# Patient Record
Sex: FEMALE | Race: WHITE | Employment: FULL TIME | URBAN - METROPOLITAN AREA
[De-identification: names, ages, dates, MRNs, and addresses within clinical notes are randomized per-mention and may not be internally consistent; named-entity substitution may affect disease eponyms.]

---

## 2019-08-28 ENCOUNTER — HOSPITAL ENCOUNTER (OUTPATIENT)
Facility: CLINIC | Age: 54
Discharge: HOME | End: 2019-08-28
Attending: COLON & RECTAL SURGERY | Admitting: COLON & RECTAL SURGERY
Payer: COMMERCIAL

## 2019-08-28 VITALS
HEART RATE: 67 BPM | OXYGEN SATURATION: 96 % | WEIGHT: 165 LBS | DIASTOLIC BLOOD PRESSURE: 67 MMHG | SYSTOLIC BLOOD PRESSURE: 110 MMHG | HEIGHT: 63 IN | BODY MASS INDEX: 29.23 KG/M2 | RESPIRATION RATE: 20 BRPM

## 2019-08-28 LAB — COLONOSCOPY: NORMAL

## 2019-08-28 PROCEDURE — 88305 TISSUE EXAM BY PATHOLOGIST: CPT | Performed by: COLON & RECTAL SURGERY

## 2019-08-28 PROCEDURE — 88305 TISSUE EXAM BY PATHOLOGIST: CPT | Mod: 26 | Performed by: COLON & RECTAL SURGERY

## 2019-08-28 PROCEDURE — 25000128 H RX IP 250 OP 636: Performed by: COLON & RECTAL SURGERY

## 2019-08-28 PROCEDURE — G0500 MOD SEDAT ENDO SERVICE >5YRS: HCPCS | Performed by: COLON & RECTAL SURGERY

## 2019-08-28 PROCEDURE — 45385 COLONOSCOPY W/LESION REMOVAL: CPT | Mod: PT | Performed by: COLON & RECTAL SURGERY

## 2019-08-28 PROCEDURE — 99153 MOD SED SAME PHYS/QHP EA: CPT | Performed by: COLON & RECTAL SURGERY

## 2019-08-28 PROCEDURE — 45380 COLONOSCOPY AND BIOPSY: CPT | Performed by: COLON & RECTAL SURGERY

## 2019-08-28 RX ORDER — NALOXONE HYDROCHLORIDE 0.4 MG/ML
.1-.4 INJECTION, SOLUTION INTRAMUSCULAR; INTRAVENOUS; SUBCUTANEOUS
Status: DISCONTINUED | OUTPATIENT
Start: 2019-08-28 | End: 2019-08-28 | Stop reason: HOSPADM

## 2019-08-28 RX ORDER — LIDOCAINE 40 MG/G
CREAM TOPICAL
Status: DISCONTINUED | OUTPATIENT
Start: 2019-08-28 | End: 2019-08-28 | Stop reason: HOSPADM

## 2019-08-28 RX ORDER — ONDANSETRON 4 MG/1
4 TABLET, ORALLY DISINTEGRATING ORAL EVERY 6 HOURS PRN
Status: DISCONTINUED | OUTPATIENT
Start: 2019-08-28 | End: 2019-08-28 | Stop reason: HOSPADM

## 2019-08-28 RX ORDER — PROCHLORPERAZINE MALEATE 10 MG
10 TABLET ORAL EVERY 6 HOURS PRN
Status: DISCONTINUED | OUTPATIENT
Start: 2019-08-28 | End: 2019-08-28 | Stop reason: HOSPADM

## 2019-08-28 RX ORDER — FLUMAZENIL 0.1 MG/ML
0.2 INJECTION, SOLUTION INTRAVENOUS
Status: DISCONTINUED | OUTPATIENT
Start: 2019-08-28 | End: 2019-08-28 | Stop reason: HOSPADM

## 2019-08-28 RX ORDER — ONDANSETRON 2 MG/ML
4 INJECTION INTRAMUSCULAR; INTRAVENOUS
Status: DISCONTINUED | OUTPATIENT
Start: 2019-08-28 | End: 2019-08-28 | Stop reason: HOSPADM

## 2019-08-28 RX ORDER — FENTANYL CITRATE 50 UG/ML
INJECTION, SOLUTION INTRAMUSCULAR; INTRAVENOUS PRN
Status: DISCONTINUED | OUTPATIENT
Start: 2019-08-28 | End: 2019-08-28 | Stop reason: HOSPADM

## 2019-08-28 RX ORDER — ONDANSETRON 2 MG/ML
4 INJECTION INTRAMUSCULAR; INTRAVENOUS EVERY 6 HOURS PRN
Status: DISCONTINUED | OUTPATIENT
Start: 2019-08-28 | End: 2019-08-28 | Stop reason: HOSPADM

## 2019-08-28 ASSESSMENT — MIFFLIN-ST. JEOR: SCORE: 1317.57

## 2019-08-28 NOTE — H&P
"Pre-Endoscopy History and Physical   Barbra Salinas MRN# [id number]   YOB: 1965 Age: 54 year old   Date of Procedure: 8/28/2019   Primary care provider: Christin Cunningham   Type of Endoscopy: colonoscopy   Reason for Procedure: screening   Type of Anesthesia Anticipated: Moderate Sedation   HPI:   Barbra is a 54 year old female for screening colonoscopy.  She has never had a colonoscopy before.  She denies BRBPR, abdominal pain, nausea/vomiting, changes in bowel habits or unintentional weight loss.  She denies a FH of CRC.    Allergies   Allergen Reactions     No Known Allergies       Prior to Admission Medications   Prescriptions Last Dose Informant Patient Reported? Taking?   KEFLEX 500 MG PO CAPS Unknown at Unknown time  No No   Sig: TAKE ONE CAPSULE 4 TIMES DAILY PO FOR 10 DAYS   METROCREAM 0.75 % EX CREA Unknown at Unknown time  No No   Sig: apply to affected BID   NO ACTIVE MEDICATIONS Unknown at Unknown time  No No   Sig: .   RETIN-A 0.025 % EX CREA Unknown at Unknown time  No No   Sig: apply to affected area qhs      Facility-Administered Medications: None      Patient Active Problem List   Diagnosis     Rosacea     CARDIOVASCULAR SCREENING; LDL GOAL LESS THAN 160      History reviewed. No pertinent past medical history.   History reviewed. No pertinent surgical history.   Social History     Tobacco Use     Smoking status: Never Smoker     Smokeless tobacco: Never Used   Substance Use Topics     Alcohol use: Not on file      Family History   Problem Relation Age of Onset     Diabetes Mother      Hypertension Mother      Hypertension Father       PHYSICAL EXAM:   BP (!) 142/84   Pulse 69   Resp 11   Ht 1.6 m (5' 3\")   Wt 74.8 kg (165 lb)   SpO2 99%   BMI 29.23 kg/m   Estimated body mass index is 29.23 kg/m  as calculated from the following:    Height as of this encounter: 1.6 m (5' 3\").    Weight as of this encounter: 74.8 kg (165 lb).   RESP: lungs clear to auscultation - no rales, " rhonchi or wheezes   CV: regular rates and rhythm   ASA Class 2 - Mild systemic disease    Assessment: 53 y/o woman for screening colonoscopy    Plan: Colonoscopy with moderate sedation.  Risks of the procedure were discussed including, but not limited to, bleeding, perforation and missed lesions.  Patient understands and is willing to proceed.    Tray Vincent MD ....................  8/28/2019   2:37 PM  Colon and Rectal Surgery Staff  [phone]

## 2019-08-28 NOTE — DISCHARGE INSTRUCTIONS
The patient has received a copy of the Provation  report the doctor has written and discharge instructions have been discussed with the patient and responsible adult.  All questions were addressed and answered prior to patient discharge.    Understanding Colon and Rectal Polyps     The colon has a smooth lining composed of millions of cells.     The colon (also called the large intestine) is a muscular tube that forms the last part of the digestive tract. It absorbs water and stores food waste. The colon is about 4 to 6 feet long. The rectum is the last 6 inches of the colon. The colon and rectum have a smooth lining composed of millions of cells. Changes in these cells can lead to growths in the colon that can become cancerous and should be removed.     When the Colon Lining Changes  Changes that occur in the cells that line the colon or rectum can lead to growths called polyps. Over a period of years, polyps can turn cancerous. Removing polyps early may prevent cancer from ever forming.      Polyps  Polyps are fleshy clumps of tissue that form on the lining of the colon or rectum. Small polyps are usually benign (not cancerous). However, over time, cells in a polyp can change and become cancerous. The larger a polyp grows, the more likely this is to happen. Also, certain types of polyps known as adenomatous polyps are considered premalignant. This means that they will almost always become cancerous if they re not removed.          Cancer  Almost all colorectal cancers start when polyp cells begin growing abnormally. As a cancerous tumor grows, it may involve more and more of the colon or rectum. In time, cancer can also grow beyond the colon or rectum and spread to nearby organs or to glands called lymph nodes. The cells can also travel to other parts of the body. This is known as metastasis. The earlier a cancerous tumor is removed, the better the chance of preventing its spread.        [id number] Martina Dawkins,  [address] [place] [address]11. All rights reserved. This information is not intended as a substitute for professional medical care. Always follow your healthcare professional's instructions.

## 2019-08-29 LAB — COPATH REPORT: NORMAL

## 2021-04-06 ENCOUNTER — TRANSFERRED RECORDS (OUTPATIENT)
Dept: HEALTH INFORMATION MANAGEMENT | Facility: CLINIC | Age: 56
End: 2021-04-06

## 2021-04-06 ENCOUNTER — HOSPITAL ENCOUNTER (OUTPATIENT)
Facility: CLINIC | Age: 56
Setting detail: OBSERVATION
Discharge: HOME | End: 2021-04-07
Attending: EMERGENCY MEDICINE | Admitting: INTERNAL MEDICINE
Payer: COMMERCIAL

## 2021-04-06 DIAGNOSIS — K21.9 GASTROESOPHAGEAL REFLUX DISEASE WITHOUT ESOPHAGITIS: Primary | ICD-10-CM

## 2021-04-06 DIAGNOSIS — R07.9 CHEST PAIN, UNSPECIFIED TYPE: ICD-10-CM

## 2021-04-06 DIAGNOSIS — I10 ESSENTIAL HYPERTENSION: ICD-10-CM

## 2021-04-06 LAB
TROPONIN I SERPL-MCNC: <0.015 UG/L (ref 0–0.04)
TROPONIN I SERPL-MCNC: <0.015 UG/L (ref 0–0.04)

## 2021-04-06 PROCEDURE — 99220 PR INITIAL OBSERVATION CARE,LEVEL III: CPT | Performed by: INTERNAL MEDICINE

## 2021-04-06 PROCEDURE — 84484 ASSAY OF TROPONIN QUANT: CPT | Performed by: EMERGENCY MEDICINE

## 2021-04-06 PROCEDURE — 250N000013 HC RX MED GY IP 250 OP 250 PS 637: Performed by: INTERNAL MEDICINE

## 2021-04-06 PROCEDURE — 99285 EMERGENCY DEPT VISIT HI MDM: CPT

## 2021-04-06 PROCEDURE — 84484 ASSAY OF TROPONIN QUANT: CPT | Mod: 91 | Performed by: INTERNAL MEDICINE

## 2021-04-06 PROCEDURE — G0378 HOSPITAL OBSERVATION PER HR: HCPCS

## 2021-04-06 PROCEDURE — 36415 COLL VENOUS BLD VENIPUNCTURE: CPT | Performed by: INTERNAL MEDICINE

## 2021-04-06 PROCEDURE — 93005 ELECTROCARDIOGRAM TRACING: CPT | Mod: 76

## 2021-04-06 PROCEDURE — 93005 ELECTROCARDIOGRAM TRACING: CPT

## 2021-04-06 RX ORDER — ASPIRIN 81 MG/1
81 TABLET ORAL DAILY
Status: DISCONTINUED | OUTPATIENT
Start: 2021-04-07 | End: 2021-04-07 | Stop reason: HOSPADM

## 2021-04-06 RX ORDER — AMLODIPINE BESYLATE 5 MG/1
5 TABLET ORAL DAILY
Status: ON HOLD | COMMUNITY
End: 2021-04-07

## 2021-04-06 RX ORDER — ASPIRIN 81 MG/1
162 TABLET, CHEWABLE ORAL ONCE
Status: DISCONTINUED | OUTPATIENT
Start: 2021-04-06 | End: 2021-04-06

## 2021-04-06 RX ORDER — NITROGLYCERIN 0.4 MG/1
0.4 TABLET SUBLINGUAL EVERY 5 MIN PRN
Status: DISCONTINUED | OUTPATIENT
Start: 2021-04-06 | End: 2021-04-07 | Stop reason: HOSPADM

## 2021-04-06 RX ORDER — MORPHINE SULFATE 2 MG/ML
2 INJECTION, SOLUTION INTRAMUSCULAR; INTRAVENOUS
Status: DISCONTINUED | OUTPATIENT
Start: 2021-04-06 | End: 2021-04-07 | Stop reason: HOSPADM

## 2021-04-06 RX ORDER — LIDOCAINE 40 MG/G
CREAM TOPICAL
Status: DISCONTINUED | OUTPATIENT
Start: 2021-04-06 | End: 2021-04-07 | Stop reason: HOSPADM

## 2021-04-06 RX ORDER — ACETAMINOPHEN 325 MG/1
650 TABLET ORAL EVERY 4 HOURS PRN
Status: DISCONTINUED | OUTPATIENT
Start: 2021-04-06 | End: 2021-04-07 | Stop reason: HOSPADM

## 2021-04-06 RX ORDER — MAGNESIUM HYDROXIDE/ALUMINUM HYDROXICE/SIMETHICONE 120; 1200; 1200 MG/30ML; MG/30ML; MG/30ML
30 SUSPENSION ORAL EVERY 4 HOURS PRN
Status: DISCONTINUED | OUTPATIENT
Start: 2021-04-06 | End: 2021-04-07 | Stop reason: HOSPADM

## 2021-04-06 RX ORDER — NALOXONE HYDROCHLORIDE 0.4 MG/ML
0.2 INJECTION, SOLUTION INTRAMUSCULAR; INTRAVENOUS; SUBCUTANEOUS
Status: DISCONTINUED | OUTPATIENT
Start: 2021-04-06 | End: 2021-04-07 | Stop reason: HOSPADM

## 2021-04-06 RX ORDER — HYDRALAZINE HYDROCHLORIDE 20 MG/ML
10 INJECTION INTRAMUSCULAR; INTRAVENOUS EVERY 4 HOURS PRN
Status: DISCONTINUED | OUTPATIENT
Start: 2021-04-06 | End: 2021-04-07 | Stop reason: HOSPADM

## 2021-04-06 RX ORDER — NALOXONE HYDROCHLORIDE 0.4 MG/ML
0.4 INJECTION, SOLUTION INTRAMUSCULAR; INTRAVENOUS; SUBCUTANEOUS
Status: DISCONTINUED | OUTPATIENT
Start: 2021-04-06 | End: 2021-04-07 | Stop reason: HOSPADM

## 2021-04-06 RX ORDER — AMLODIPINE BESYLATE 5 MG/1
5 TABLET ORAL DAILY
Status: DISCONTINUED | OUTPATIENT
Start: 2021-04-06 | End: 2021-04-07 | Stop reason: HOSPADM

## 2021-04-06 RX ORDER — FAMOTIDINE 20 MG/1
20 TABLET, FILM COATED ORAL 2 TIMES DAILY PRN
COMMUNITY

## 2021-04-06 RX ORDER — LORAZEPAM 0.5 MG/1
0.5 TABLET ORAL 3 TIMES DAILY PRN
Status: DISCONTINUED | OUTPATIENT
Start: 2021-04-06 | End: 2021-04-07 | Stop reason: HOSPADM

## 2021-04-06 RX ADMIN — ACETAMINOPHEN 650 MG: 325 TABLET, FILM COATED ORAL at 21:21

## 2021-04-06 RX ADMIN — AMLODIPINE BESYLATE 5 MG: 5 TABLET ORAL at 21:21

## 2021-04-06 ASSESSMENT — ENCOUNTER SYMPTOMS
FEVER: 0
PALPITATIONS: 1
COUGH: 0
ABDOMINAL PAIN: 0
SHORTNESS OF BREATH: 0
CHEST TIGHTNESS: 1

## 2021-04-06 ASSESSMENT — MIFFLIN-ST. JEOR: SCORE: 1348.39

## 2021-04-06 NOTE — ED PROVIDER NOTES
"  History   Chief Complaint:  Chest Pain       The history is provided by the patient.      Barbra Salinas is a 56 year old female with history of hypertension who presents with chest pain. She was recently diagnosed with hypertension and seen at [facility] in [place] for similar symptoms in addition to feeling \"off\" and unable to focus. See laboratory and imaging results below. When she woke up around 0730 she notes her chest was pounding, heart was racing, and a pain in her left arm she describes as achy. She experiences associated chest pressure with her heartbeats. Walking helps relive pain.  She is not taking any hormones. Denies fever, cough, difficultly breathing, coughing up blood, abdominal pain, leg swelling . Here, in the ER she feels better after receiving sublingual nitroglycerin x2 at [facility] room. Denies recent long trips or surgeries.     Laboratory:  CBC: WBC 6.7 (WNL), HGB 14.2 (WNL),  (WNL)    BMP: Creat 0.60 (WNL), B/C Ratio 23 (H) o/w WNL     Troponin I: <0.019  Collection Time: 14:15    Imaging:   XR Chest 2 Views PA and Lateral:  No Acute Abnormality.   No Focal Pulmonary Infiltrate, Pleural Effusion, Or Pneumothorax. Borderline Cardiac Enlargement May Be Secondary To The Scoliosis And A Mild Pectus Excavatum. Moderate To Severe Thoracolumbar Rotary Scoliosis.  Reading per radiology.    ECG:  Indication: Sinus rhytm  Time taken: 14:10  Findings: Sinus rhythm. Normal ECG. Vent. rate 85 bpm, R-axes 17.    Review of Systems   Constitutional: Negative for fever.   Respiratory: Positive for chest tightness. Negative for cough and shortness of breath.         Denies hemoptysis.   Cardiovascular: Positive for chest pain and palpitations. Negative for leg swelling.   Gastrointestinal: Negative for abdominal pain.   All other systems reviewed and are negative.    Allergies:  No Known Allergies    Medications:  KEFLEX   amlodipine    Past Medical History:    Hypertension   Rosacea "     Family History:    Mother: diabetes mellitus, hypertension   Father: hypertension     Social History:  Presents alone.   PCP:  Christin Cunningham    Physical Exam     Patient Vitals for the past 24 hrs:   BP Temp Temp src Pulse Resp SpO2   04/06/21 1845 (!) 161/88 -- -- 68 -- 98 %   04/06/21 1830 -- -- -- 75 16 97 %   04/06/21 1815 -- -- -- 71 16 98 %   04/06/21 1800 -- -- -- 72 -- 98 %   04/06/21 1746 -- 98.3  F (36.8  C) Oral -- -- --   04/06/21 1745 (!) 182/91 -- -- 81 -- 99 %   04/06/21 1744 -- -- -- 73 20 98 %   04/06/21 1743 (!) 184/95 -- -- 75 -- 99 %   04/06/21 1741 (!) 176/97 -- Oral 72 18 99 %       Physical Exam  VS: Reviewed per above  HENT: Mucous membranes moist  EYES: sclera anicteric  CV: Rate as noted, regular rhythm.   RESP: Effort normal. Breath sounds are normal bilaterally.  GI: no tenderness/rebound/guarding, not distended.  NEURO: Alert, moving all extremities  MSK: No deformity of the extremities  SKIN: Warm and dry    Emergency Department Course     ECG:  ECG taken at 1744  Normal sinus rhythm  Normal ECG  No significant change when compared to EKG dated 1/14/10.   Rate 75 bpm. RI interval 168 ms. QRS duration 80 ms. QT/QTc 386/431 ms. P-R-T axes 62 20 48.     Laboratory:  Troponin: (Collected 1747) <0.015     Emergency Department Course:    Reviewed:  I reviewed nursing notes, vitals, past medical history and care everywhere    Assessments:  1750 I obtained history and examined the patient as noted above.     Consults:   1803 : I spoke with Dr. Bello of the Hospitalist service from [facility] regarding patient's presentation, findings, and plan of care.    Disposition:  The patient was admitted to the hospital under the care of Dr. Bello.     Impression & Plan     Medical Decision Making:  Patient presents to the ER for evaluation of chest pain.  Patient was seen in emergency room initially and had negative troponin x2, reassuring EKG, negative chest x-ray without concern for  pneumonia, pneumothorax, aortic dissection.  Her pain did resolve after nitroglycerin and thus there was concern for possible cardiac etiology for her pain.  No respiratory symptoms or tachycardia or hypoxemia to suggest underlying PE.  Here in the ER she is asymptomatic.  EKG here does not show ischemic change.  A single troponin here is also negative.  Patient was admitted to hospital for further cardiac evaluation.    Diagnosis:    ICD-10-CM    1. Chest pain, unspecified type  R07.9 Troponin I       Discharge Medications:  New Prescriptions    No medications on file       Scribe Disclosure:  IMatt, am serving as a scribe at 5:37 PM on 4/6/2021 to document services personally performed by Jl Faulkner MD based on my observations and the provider's statements to me.      Jl Faulkner MD  04/06/21 2135       Jl Faulkner MD  04/06/21 2136

## 2021-04-06 NOTE — ED NOTES
Box lunch provided along with juice per request.  MD approved.  Patient sitting at bedside.  Denies dizziness or light-headed.

## 2021-04-06 NOTE — ED NOTES
".  [facility]  ED Nurse Handoff Report    Barbra Salinas is a 56 year old female   ED Chief complaint: Chest Pain  . ED Diagnosis:   Final diagnoses:   Chest pain, unspecified type     Allergies:   Allergies   Allergen Reactions     Ciprofloxacin Nausea     No Known Allergies        Code Status: Full Code  Activity level - Baseline/Home:  Independent. Activity Level - Current:   Stand by Assist. Lift room needed: No. Bariatric: No   Needed: No   Isolation: No. Infection: Not Applicable.     Vital Signs:   Vitals:    04/06/21 1743 04/06/21 1744 04/06/21 1745 04/06/21 1746   BP: (!) 184/95  (!) 182/91    Pulse: 75 73 81    Resp:  20     Temp:    98.3  F (36.8  C)   TempSrc:    Oral   SpO2: 99% 98% 99%        Cardiac Rhythm:  ,   Cardiac  Cardiac Rhythm: Normal sinus rhythm  Pain level:  3/10  Patient confused: No. Patient Falls Risk: Yes.   Elimination Status: has not voided as of 1831   Patient Report - Initial Complaint: a 56 year old female with history of hypertension who presents with chest pain. She was recently diagnosed with hypertension and seen at [facility] in [place] for similar symptoms in addition to feeling \"off\" and unable to focus. See laboratory and imaging results below. When she woke up around 0730 she notes her chest was pounding, heart was racing, and a pain in her left arm she describes as achy. She experiences associated chest pressure with her heartbeats. Walking helps relive pain.  She is not taking any hormones. Denies fever, cough, difficultly breathing, coughing up blood, abdominal pain, leg swelling . Here, in the ER she feels better. Denies recent long trips or surgeries. .   Focused Assessment: Cardiac - Cardiac WDL: .WDL except; all  Cardiac Rhythm: apical pulse regular; radial pulse regular   Chest Pain Assessment - Rating (0-10): 3 (states \"I feel better than I did when I came in\") Chest Pain Location: midsternal; arm, left   Cardiac Monitoring - " "EKG Monitoring: Yes  Cardiac Regularity: Regular  Cardiac Rhythm: NSR   Respiratory - Respiratory WDL: WDL (resp even and equal; no distress noted. Satting 98-99% on RA)     Tests Performed: labs. Abnormal Results: .  Labs Ordered and Resulted from Time of ED Arrival Up to the Time of Departure from the ED   TROPONIN I     .  No orders to display      Treatments provided: see ED meds below.  Rcvd two nitr0 and 325mg ASA prior to arrival from [facility].  Second nitr0 improved chest \"pressure\"e  Family Comments: NA  OBS brochure/video discussed/provided to patient:  Yes  ED Medications: Medications - No data to display  Drips infusing:  No  For the majority of the shift, the patient's behavior Green. Interventions performed were NA.    Sepsis treatment initiated: No     Patient tested for COVID 19 prior to admission: YES, at [facility] prior to arrival.  Results are negative for covid    ED Nurse Name/Phone Number: Anel Smith RN,   6:30 PM  RECEIVING UNIT ED HANDOFF REVIEW    Above ED Nurse Handoff Report was reviewed: Yes  Reviewed by: Gabriela Maguire RN on April 6, 2021 at 7:18 PM         "

## 2021-04-06 NOTE — ED TRIAGE NOTES
"Arrives via EMS from [facility] in [place] with left chest pain to left arm which patient awoke with this morning.  Pain was never greater than 5/10.  Rcvd 324mg ASA and two nitr0 at [facility] room.  Second nitr0 improved pain \"somewhat\" per patient. Dx with htn yesterday and started on BP meds, unknown med. BP en route 170/80.  Reports chest pressure is coming back at this time. MD at bedside.  Patient is calm, cooperative, no diaphoresis. ABCD's intact; A/O x 4.   "

## 2021-04-07 ENCOUNTER — APPOINTMENT (OUTPATIENT)
Dept: CARDIOLOGY | Facility: CLINIC | Age: 56
End: 2021-04-07
Attending: INTERNAL MEDICINE
Payer: COMMERCIAL

## 2021-04-07 VITALS
WEIGHT: 174 LBS | DIASTOLIC BLOOD PRESSURE: 72 MMHG | HEIGHT: 63 IN | BODY MASS INDEX: 30.83 KG/M2 | HEART RATE: 69 BPM | RESPIRATION RATE: 16 BRPM | SYSTOLIC BLOOD PRESSURE: 159 MMHG | OXYGEN SATURATION: 97 % | TEMPERATURE: 96.5 F

## 2021-04-07 DIAGNOSIS — I16.0 HYPERTENSIVE URGENCY: ICD-10-CM

## 2021-04-07 DIAGNOSIS — R00.2 PALPITATIONS: ICD-10-CM

## 2021-04-07 DIAGNOSIS — I10 ESSENTIAL HYPERTENSION: Primary | ICD-10-CM

## 2021-04-07 LAB — INTERPRETATION ECG - MUSE: NORMAL

## 2021-04-07 PROCEDURE — 93016 CV STRESS TEST SUPVJ ONLY: CPT | Performed by: INTERNAL MEDICINE

## 2021-04-07 PROCEDURE — 93325 DOPPLER ECHO COLOR FLOW MAPG: CPT | Mod: TC

## 2021-04-07 PROCEDURE — 93321 DOPPLER ECHO F-UP/LMTD STD: CPT | Mod: 26 | Performed by: INTERNAL MEDICINE

## 2021-04-07 PROCEDURE — G0378 HOSPITAL OBSERVATION PER HR: HCPCS

## 2021-04-07 PROCEDURE — 99217 PR OBSERVATION CARE DISCHARGE: CPT | Performed by: PHYSICIAN ASSISTANT

## 2021-04-07 PROCEDURE — 93018 CV STRESS TEST I&R ONLY: CPT | Performed by: INTERNAL MEDICINE

## 2021-04-07 PROCEDURE — 93325 DOPPLER ECHO COLOR FLOW MAPG: CPT | Mod: 26 | Performed by: INTERNAL MEDICINE

## 2021-04-07 PROCEDURE — 93350 STRESS TTE ONLY: CPT | Mod: 26 | Performed by: INTERNAL MEDICINE

## 2021-04-07 PROCEDURE — 250N000013 HC RX MED GY IP 250 OP 250 PS 637: Performed by: INTERNAL MEDICINE

## 2021-04-07 RX ORDER — FAMOTIDINE 20 MG/1
20 TABLET, FILM COATED ORAL 2 TIMES DAILY PRN
Status: DISCONTINUED | OUTPATIENT
Start: 2021-04-07 | End: 2021-04-07 | Stop reason: HOSPADM

## 2021-04-07 RX ORDER — PANTOPRAZOLE SODIUM 40 MG/1
40 TABLET, DELAYED RELEASE ORAL DAILY
Qty: 30 TABLET | Refills: 1 | Status: SHIPPED | OUTPATIENT
Start: 2021-04-07

## 2021-04-07 RX ORDER — AMLODIPINE BESYLATE 5 MG/1
5 TABLET ORAL DAILY
COMMUNITY
Start: 2021-04-07

## 2021-04-07 RX ORDER — PROPRANOLOL HYDROCHLORIDE 10 MG/1
10 TABLET ORAL 3 TIMES DAILY PRN
Qty: 60 TABLET | Refills: 0 | Status: SHIPPED | OUTPATIENT
Start: 2021-04-07

## 2021-04-07 RX ADMIN — AMLODIPINE BESYLATE 5 MG: 5 TABLET ORAL at 09:09

## 2021-04-07 RX ADMIN — ASPIRIN 81 MG: 81 TABLET ORAL at 08:26

## 2021-04-07 NOTE — DISCHARGE SUMMARY
[facility]  Discharge Summary  Hospitalist    Date of Admission:  4/6/2021  Date of Discharge:  4/7/2021    Discharging Provider: Massiel Goodrich PAC    Discharge Diagnoses   1. Chest pain, atypical  2. Hypertension with recent hypertensive urgency.  Likely needs further work-up to eliminate secondary hypertension.  3. Palpitations      Discharge Disposition     Discharged to home    Condition at Discharge:  Stable    History of Present Illness   Barbra Salinas is a 56-year-old female with history of recently diagnosed hypertension and hypertensive urgency who presented to [facility] ED for palpitations and chest pain radiating down the left arm.  For full details, please see H&P by Dr Bello on 4/6/2021.  In brief, patient has recently diagnosed hypertension for which she was started on amlodipine 5 mg.  She unfortunately continues to have hypertension and headaches she attributes to hypertension.  She developed some chest pain radiating to the left arm and so was seen in urgency room and referred to the ER for further evaluation.  In the ED, /95 with otherwise unremarkable vital signs.  EKG NSR.  Troponin<0.015.  Admission requested for chest pain rule out.     Patient was brought in overnight.  Her pressures remained elevated despite PTA amlodipine 5 mg.  She underwent a stress echocardiogram which was without inducible ischemia.  Reviewed findings with patient.  Discussed that given a rather sudden onset of refractory hypertension, she would likely benefit from further work-up for secondary hypertension.  In the meantime, goal is to control her blood pressure.  We discussed that she should continue to monitor her blood pressures closely and should follow-up in the next 5-7 days with her primary care provider for ongoing monitoring.  A Zio patch has been ordered for evaluation of palpitations.  An ambulatory blood pressure evaluation has also been ordered.  Patient instructed to  take a second tablet of amlodipine for SBP greater than 150.  She is also been given a prescription for propranolol 10 mg which she can take up to 3 times daily for palpitations or for SBP greater than 150.  Patient also voiced issues with reflux lately.  She is already taking Pepcid and so she was given a script for pantoprazole.  Encouraged patient to follow closely with PCP for ongoing monitoring, particularly if pressures remain difficult to control.  Patient voiced understanding.    FELIPE Dean    Code Status   Full Code       Primary Care Physician   [facility]    Consultations This Hospital Stay   None    Time Spent on this Encounter   I, FELIPE Dean, personally saw the patient today and spent greater than 30 minutes discharging this patient.    Allergies   Allergies   Allergen Reactions     Ciprofloxacin Nausea     No Known Allergies        Physical Exam   Temp: 96.5  F (35.8  C) Temp src: Oral BP: (!) 159/72 Pulse: 69   Resp: 16 SpO2: 97 % O2 Device: None (Room air)    Vitals:    04/06/21 1943   Weight: 78.9 kg (174 lb)     Vital Signs with Ranges  Temp:  [95.7  F (35.4  C)-98.3  F (36.8  C)] 96.5  F (35.8  C)  Pulse:  [68-86] 69  Resp:  [13-22] 16  BP: (131-184)/(60-99) 159/72  SpO2:  [95 %-99 %] 97 %  No intake/output data recorded.      GENERAL:  Pleasant, cooperative, alert. Well developed, well nourished.   HEENT: Normocephalic, atraumatic.  Extra occular mm intact.  Sclera clear. PERRL.  Mucous membranes moist.   PULMONOLOGY: Clear    CARDIAC: Regular    MUSCULOSKELETAL:  Moving x 4 spontaneously with CMS intact x4.  Normal bulk and tone.     NEURO: Alert and oriented x3.  CN II-XII grossly intact and symmetric.  No ataxia or tremor.  Nonfocal.  SKIN: Warm, pink, dry    Discharge Medications   Current Discharge Medication List      START taking these medications    Details   pantoprazole (PROTONIX) 40 MG EC tablet Take 1 tablet (40 mg) by mouth daily  Qty: 30 tablet,  Refills: 1    Associated Diagnoses: Chest pain, unspecified type; Gastroesophageal reflux disease without esophagitis      propranolol (INDERAL) 10 MG tablet Take 1 tablet (10 mg) by mouth 3 times daily as needed (palpitations, heart rate above 120, or BP > 150)  Qty: 60 tablet, Refills: 0    Associated Diagnoses: Chest pain, unspecified type; Essential hypertension         CONTINUE these medications which have CHANGED    Details   amLODIPine (NORVASC) 5 MG tablet Take 1 tablet (5 mg) by mouth daily (OK to double dose to 10 mg daily for BP > 150)  Qty:           CONTINUE these medications which have NOT CHANGED    Details   famotidine (PEPCID) 20 MG tablet Take 20 mg by mouth 2 times daily as needed             Data   Most Recent 3 CBC's:No lab results found.   Most Recent 3 BMP's:No lab results found.  Most Recent 2 LFT's:No lab results found.  Most Recent INR's and Anticoagulation Dosing History:  Anticoagulation Dose History     There is no flowsheet data to display.        Most Recent 3 Troponin's:  Recent Labs   Lab Test 21  2226 21  1747   TROPI <0.015 <0.015     Most Recent Cholesterol Panel:No lab results found.  Most Recent 6 Bacteria Isolates From Any Culture (See EPIC Reports for Culture Details):No lab results found.  Most Recent TSH, T4 and A1c Labs:No lab results found.  Results for orders placed or performed during the hospital encounter of 21   Echo Stress Echocardiogram    Narrative    [id number]  [id number]  [id number]  [id number]^JAK^ALESHIA^HILARIO     [facility]  Echocardiography Laboratory  [address] [place] [address]     Name: NIR LLANES  MRN: [id number]  : 1965  Study Date: 2021 07:13 AM  Age: 56 yrs  Gender: Female  Patient Location: [facility] [place]  Reason For Study: Chest Pain  History: Hypertension  Ordering Physician: ALESHIA BENEDICT  Referring Physician: SYSTEM, PROVIDER NOT IN  Performed By: Lina Tyler RDCS     BSA: 1.8  m2  Height: 63 in  Weight: 170 lb  HR: 80  BP: 138/92 mmHg  Medications: amlodopine  ______________________________________________________________________________  Procedure  Stress Echo Complete.  ______________________________________________________________________________  Interpretation Summary  This was a normal stress echocardiogram with no evidence of stress-induced  ischemia.  This was a normal stress EKG with no evidence of stress-induced ischemia.  The Duke treadmill score was low risk ( >5 Najera score).  No hemodynamically significant valvular abnormalities on 2D or color flow  imaging.  ______________________________________________________________________________  Stress  The patient did not exhibit any symptoms during exercise.  There was a hypertensive BP response to exercise.  Exercise was stopped due to hypertension.  The Duke treadmill score was low risk ( >5 Duke score).  A treadmill exercise test according to the Krish protocol was performed.  A moderate workload was achieved.  There were no ST segment changes observed with stress.  Occasional premature ventricular contractions.  This was a normal stress EKG with no evidence of stress-induced ischemia.  Target Heart Rate was achieved.  Left ventricular cavity size decreases with exercise.  Global LV systolic function augments with exercise.  The visual ejection fraction is estimated at >70%.  Normal left ventricular function and wall motion at rest and post-stress.  This was a normal stress echocardiogram with no evidence of stress-induced  ischemia.     Baseline  The patient is in normal sinus rhythm.  Normal left ventricular function and wall motion at rest.  The visual ejection fraction is estimated at 60-65%.     Stress Results             Protocol:  Krish          Maximum Predicted HR:   164 bpm             Target HR: 139 bpm        % Maximum Predicted HR: 88 %               Stage  DurationHeart Rate  BP             Comment                     (mm:ss)   (bpm)           Stage 1   3:00      133   202/98           Stage 2   3:00      144   228/98          RecoveryR  5:00      90    146/98RPP 35748, FAC Below, DUKE 6            Stress Duration:   6:00 mm:ss *        Recovery Time: 5:00 mm:ss         Maximum Stress HR: 144 bpm *           METS:          7     ______________________________________________________________________________  Report approved by: Renetta Batres 04/07/2021 09:13 AM     ______________________________________________________________________________            Discharge Orders      Follow-up and recommended labs and tests     Please follow-up with your primary provider in 3-5 days for recheck blood pressure and to see how you are tolerating the new BP medication.     Reason for your hospital stay    You were brought in for evaluation of heart pounding and chest discomfort.  Thankfully, your cardiac evaluation has been normal, including your stress test.  Your blood pressure remains somewhat elevated so it is important you keep an eye on it, particularly when you are having an episode of chest discomfort.  If your blood pressure is really high, it could be causing your chest discomfort.  Please take your blood pressure 1-2 times per day and record these readings.  For BP > 150, OK to try either doubling your amlodipine dose (increase from 5 mg to 10 mg) or trying a tablet of propranolol.  Propranolol is a medication that can be used up to 3 times per day for palpitations, fast heart rate, or BP > 150.

## 2021-04-07 NOTE — PLAN OF CARE
ROOM # 205    Living Situation (if not independent, order SW consult): Home with  and children  Facility name: N/A  : Efren Salinas () [phone]    Activity level at baseline: Independent  Activity level on admit: Independent      Patient registered to observation; given Patient Bill of Rights; given the opportunity to ask questions about observation status and their plan of care.  Patient has been oriented to the observation room, bathroom and call light is in place.    Discussed discharge goals and expectations with patient/family.

## 2021-04-07 NOTE — PHARMACY-ADMISSION MEDICATION HISTORY
Admission medication history interview status for this patient is complete. See [facility] admission navigator for allergy information, prior to admission medications and immunization status.     Medication history interview done, indicate source(s): Patient  Medication history resources (including written lists, pill bottles, clinic record):EPIC    Changes made to PTA medication list:  Added: amlodipine, pepcid prn    Prior to Admission medications    Medication Sig Last Dose Taking? Auth Provider   amLODIPine (NORVASC) 5 MG tablet Take 5 mg by mouth daily  4/5/2021 at  Yes Unknown, Entered By History   famotidine (PEPCID) 20 MG tablet Take 20 mg by mouth 2 times daily as needed  Yes Unknown, Entered By History

## 2021-04-07 NOTE — PLAN OF CARE
Patient's After Visit Summary was reviewed with patient.   Patient verbalized understanding of After Visit Summary, recommended follow up and was given an opportunity to ask questions.   Discharge medications sent home with patient/family: No   Discharged with spouse  VSS. Declined wheelchair ride.

## 2021-04-07 NOTE — PLAN OF CARE
"PRIMARY DIAGNOSIS: CHEST PAIN  OUTPATIENT/OBSERVATION GOALS TO BE MET BEFORE DISCHARGE:  1. Ruled out acute coronary syndrome (negative or stable Troponin):  Yes, Trops negative x2.   2. Pain Status: Improved-controlled with oral pain medications.  3. Appropriate provocative testing performed: N/A  - Stress Test Procedure: Echo, stress to be completed in AM.   - Interpretation of cardiac rhythm per telemetry tech: Sinus Rhythm HR 73.    4. Cleared by Consultants (if applicable):No  5. Return to near baseline physical activity: Yes  Discharge Planner Nurse   Safe discharge environment identified: Yes  Barriers to discharge: Yes       Entered by: Gabriela Maguire 04/07/2021 4:43 AM   Pt. A&Ox 4, VSS, denies pain. Denies chest pain, tightness, or radiating pain anywhere. Pt. Up independently. PIV SL. NPO since 0300. Tele in place SR HR 73 per tele tech. Trops have been negative x2. Pt. Reports she continues to feel \"off\". Plan for echo stress test in AM. Nursing to continue to monitor and assess and provide supportive cares.   Please review provider order for any additional goals.   Nurse to notify provider when observation goals have been met and patient is ready for discharge.    "

## 2021-04-07 NOTE — PLAN OF CARE
PRIMARY DIAGNOSIS: CHEST PAIN  OUTPATIENT/OBSERVATION GOALS TO BE MET BEFORE DISCHARGE:  1. Ruled out acute coronary syndrome (negative or stable Troponin):  Yes  2. Pain Status: Pain free.  3. Appropriate provocative testing performed: Yes  - Stress Test Procedure: Treadmill Echo  - Interpretation of cardiac rhythm per telemetry tech: SR/HR 72 per tele tech    4. Cleared by Consultants (if applicable):N/A  5. Return to near baseline physical activity: Yes  Discharge Planner Nurse   Safe discharge environment identified: Yes  Barriers to discharge: Yes       Entered by: SHARMAINE SNYDER 04/07/2021   Vital signs:  Temp: 95.7  F (35.4  C) Temp src: Oral BP: (!) 161/81 Pulse: 80   Resp: 16 SpO2: 97 % O2 Device: None (Room air)  Alert and oriented x4. Denies chest pain/tightness/nausea/vomiting/numbness this morning. Trops negative x2. Stress echocardiogram completed this morning. Saline locked. On clear liquid, no caffeine diet. Up independently. Will continue to monitor.      Please review provider order for any additional goals.   Nurse to notify provider when observation goals have been met and patient is ready for discharge.

## 2021-04-07 NOTE — H&P
[facility]  History and Physical   Hospitalist Service    Sergio Bello MD    Barbra Salinas MRN# [id number]   YOB: 1965 Age: 56 year old      Date of Admission:  4/6/2021           Assessment and Plan:   Barbra Salinas is a 56-year-old female with history of recently diagnosed hypertension.  Actually, she was just diagnosed with high blood pressure yesterday at the [facility].  She was prescribed amlodipine 5 mg daily which she started last night.  This morning she woke up at about 7:30 AM and felt like her heart was pounding.  She had some chest discomfort with this pounding of her heart.  She also had some left upper extremity achiness.  These symptoms persisted during the day.  They seemed to maybe get a little bit better with walking.  She had no associated cough, fever, shortness of breath, nausea, or vomiting.  Given her persistent symptoms she went back to the [facility] for re-evaluation.  Work-up there showed normal basic metabolic panel, CBC, troponin, and chest x-ray.  She was given nitroglycerin with resolution of chest pain.  She was referred to the emergency department here at [facility] for admission for cardiac evaluation.  Emergency department evaluation showed elevated blood pressure as high as 184/95 with otherwise unremarkable vital signs.  A COVID-19 test was checked and was negative.  An EKG was checked and showed no signs of ischemia.  Troponin was checked and was also undetectable.  I was asked to admit her for further evaluation.    Problem list:    1.  Chest pain.  This is somewhat atypical sounding as it improved with walking and there were no associated symptoms.  Could be related to hypertension.  She likely has some anxiety associated with this new diagnosis of hypertension and persistently elevated blood pressure.  Given her age and the fact that the chest pain seemed to improve with nitroglycerin I do think that  additional cardiac work-up is warranted, however.  Admit to observation.  Monitor on telemetry.  Check another troponin at 10 PM to rule out for myocardial infarction.  Assuming that she rules out I would proceed with stress echocardiogram tomorrow.  Treat hypertension as discussed below.  I have ordered daily aspirin.  Repeat ECG, Tylenol, and IV morphine are available in case chest pain comes back.    2.  Hypertension.  This is likely new essential hypertension.  She did take 5 mg of amlodipine last night.  She did have a couple of blood pressures that were not too bad in the 140 to 150 systolic range earlier to day.  I have ordered amlodipine 5 mg now and then each morning. This may need to be increased to 10 mg daily or hydrochlorothiazide may need to be added. I have ordered IV hydralazine for use as needed.    Full code  Ambulate for DVT prophylaxis  Disposition: Admit to observation           Code Status:   Full Code         Primary Care Physician:   Fara Goldstein [phone]         Chief Complaint:   High blood pressure, palpitations, chest pain, and left arm ache    History is obtained from Dr. Lucie Belle, and the medical record.         History of Present Illness:   Barbra Salinas is a 56-year-old female with history of recently diagnosed hypertension.  Actually, she was just diagnosed with high blood pressure yesterday at the [facility].  She was prescribed amlodipine 5 mg daily which she started last night.  This morning she woke up at about 7:30 AM and felt like her heart was pounding.  She had some chest discomfort with this pounding of her heart.  She also had some left upper extremity achiness.  These symptoms persisted during the day.  They seemed to maybe get a little bit better with walking.  She had no associated cough, fever, shortness of breath, nausea, or vomiting.  Given her persistent symptoms she went back to the [facility] for re-evaluation.  Work-up there  showed normal basic metabolic panel, CBC, troponin, and chest x-ray.  She was given nitroglycerin with resolution of chest pain.  She was referred to the emergency department here at [facility] for admission for cardiac evaluation.  Emergency department evaluation showed elevated blood pressure as high as 184/95 with otherwise unremarkable vital signs.  A COVID-19 test was checked and was negative.  An EKG was checked and showed no signs of ischemia.  Troponin was checked and was also undetectable.  I was asked to admit her for further evaluation.           Past Medical History:     Patient Active Problem List   Diagnosis     Rosacea     CARDIOVASCULAR SCREENING; LDL GOAL LESS THAN 160     Chest pain, unspecified type     Essential hypertension   Hypertension          Past Surgical History:     Past Surgical History:   Procedure Laterality Date     COLONOSCOPY N/A 8/28/2019    Procedure: COLONOSCOPY (crsal) polypectomy with exacto;  Surgeon: Tray Vincent MD;  Location: [facility] Medications:     Prior to Admission medications    Medication Sig Last Dose Taking? Auth Provider   KEFLEX 500 MG PO CAPS TAKE ONE CAPSULE 4 TIMES DAILY PO FOR 10 DAYS   Rosio Lane PA-C   METROCREAM 0.75 % EX CREA apply to affected BID   Rosio Crouch MD   NO ACTIVE MEDICATIONS .      RETIN-A 0.025 % EX CREA apply to affected area q   Rosio Crouch MD            Allergies:     Allergies   Allergen Reactions     Ciprofloxacin Nausea     No Known Allergies             Social History:     Social History     Tobacco Use     Smoking status: Never Smoker     Smokeless tobacco: Never Used   Substance Use Topics     Alcohol use: Not on file             Family History:     Family History   Problem Relation Age of Onset     Diabetes Mother      Hypertension Mother      Hypertension Father               Review of Systems:   The 10 point Review of Systems is negative other than as noted in the HPI.            Physical Exam:   Blood pressure (!) 168/77, pulse 81, temperature 98.1  F (36.7  C), temperature source Oral, resp. rate 18, weight 78.9 kg (174 lb), SpO2 95 %.  174 lbs 0 oz      GENERAL: Pleasant and cooperative. No acute distress but a bit anxious regarding her elevated blood pressure.  EYES: Pupils equal and round. No scleral erythema or icterus.  ENT: External ears are normal without deformity. Posterior oropharynx is without erythem, swelling, or exudate.  NECK: Supple. No masses or swelling. No tenderness. Thyroid is normal without mass or tenderness.  CHEST: Clear to auscultation. Normal breath sounds. No retractions.   CV: Regular rate and rhythm. No JVD. Pulses normal.  ABDOMEN: Bowel sounds present. No tenderness. No masses or hernia.  EXTREMETIES: No clubbing, cyanosis, or ischemia.  SKIN: Warm and dry to touch. No wounds or rashes.  NEUROLOGIC: Strength and sensation are normal. Deep tendon reflexes are normal. Cranial nerves are normal.             Data:   All new lab and imaging data was reviewed.     Results for orders placed or performed during the hospital encounter of 04/06/21 (from the past 24 hour(s))   Troponin I   Result Value Ref Range    Troponin I ES <0.015 0.000 - 0.045 ug/L

## 2021-04-07 NOTE — PLAN OF CARE
"PRIMARY DIAGNOSIS: CHEST PAIN  OUTPATIENT/OBSERVATION GOALS TO BE MET BEFORE DISCHARGE:  1. Ruled out acute coronary syndrome (negative or stable Troponin):  Yes, Trops negative x2.   2. Pain Status: Improved-controlled with oral pain medications.  3. Appropriate provocative testing performed: N/A  - Stress Test Procedure: Echo, stress to be completed in AM.   - Interpretation of cardiac rhythm per telemetry tech: Sinus Rhythm HR 67.    4. Cleared by Consultants (if applicable):No  5. Return to near baseline physical activity: Yes  Discharge Planner Nurse   Safe discharge environment identified: Yes  Barriers to discharge: Yes       Entered by: Gabriela Maguire 04/07/2021 3:44 AM   Pt. A&Ox 4, VSS, reported slight HA Pt. Believes from the nitroglycerin. PRN Tylenol administered with improvement of headache. Pt. Reports she does get migraines occasionally. Denies chest pain, tightness, or radiating pain anywhere. Pt. Up independently. PIV SL. Clear diet, will be NPO at 0300. Tele in place SR HR 67 per tele tech. Trops have been negative x2. Pt. Reports she continues to feel \"off\". Plan for echo stress test in AM. Nursing to continue to monitor and assess and provide supportive cares.   Please review provider order for any additional goals.   Nurse to notify provider when observation goals have been met and patient is ready for discharge.    "

## (undated) DEVICE — KIT ENDO TURNOVER/PROCEDURE W/CLEAN A SCOPE LINERS 103888

## (undated) DEVICE — ENDO SNARE EXACTO COLD 9MM LOOP 2.4MMX230CM 00711115

## (undated) DEVICE — ENDO TRAP POLYP QUICK CATCH 710201

## (undated) RX ORDER — FENTANYL CITRATE 50 UG/ML
INJECTION, SOLUTION INTRAMUSCULAR; INTRAVENOUS
Status: DISPENSED
Start: 2019-08-28